# Patient Record
Sex: FEMALE | Race: WHITE | NOT HISPANIC OR LATINO
[De-identification: names, ages, dates, MRNs, and addresses within clinical notes are randomized per-mention and may not be internally consistent; named-entity substitution may affect disease eponyms.]

---

## 2018-08-23 ENCOUNTER — APPOINTMENT (OUTPATIENT)
Dept: CT IMAGING | Facility: IMAGING CENTER | Age: 68
End: 2018-08-23
Payer: MEDICARE

## 2018-08-23 ENCOUNTER — OUTPATIENT (OUTPATIENT)
Dept: OUTPATIENT SERVICES | Facility: HOSPITAL | Age: 68
LOS: 1 days | End: 2018-08-23
Payer: MEDICARE

## 2018-08-23 DIAGNOSIS — Z00.8 ENCOUNTER FOR OTHER GENERAL EXAMINATION: ICD-10-CM

## 2018-08-23 PROCEDURE — 71250 CT THORAX DX C-: CPT

## 2018-08-23 PROCEDURE — 71250 CT THORAX DX C-: CPT | Mod: 26

## 2019-05-13 ENCOUNTER — RESULT REVIEW (OUTPATIENT)
Age: 69
End: 2019-05-13

## 2019-10-16 ENCOUNTER — APPOINTMENT (OUTPATIENT)
Dept: OPHTHALMOLOGY | Facility: CLINIC | Age: 69
End: 2019-10-16

## 2021-07-27 ENCOUNTER — APPOINTMENT (OUTPATIENT)
Dept: ORTHOPEDIC SURGERY | Facility: CLINIC | Age: 71
End: 2021-07-27
Payer: MEDICARE

## 2021-07-27 VITALS — BODY MASS INDEX: 32.25 KG/M2 | WEIGHT: 160 LBS | HEIGHT: 59 IN

## 2021-07-27 DIAGNOSIS — Z87.898 PERSONAL HISTORY OF OTHER SPECIFIED CONDITIONS: ICD-10-CM

## 2021-07-27 PROCEDURE — 72100 X-RAY EXAM L-S SPINE 2/3 VWS: CPT

## 2021-07-27 PROCEDURE — 99204 OFFICE O/P NEW MOD 45 MIN: CPT

## 2021-07-27 PROCEDURE — 72070 X-RAY EXAM THORAC SPINE 2VWS: CPT

## 2021-07-27 RX ORDER — OMEPRAZOLE 20 MG/1
20 CAPSULE, DELAYED RELEASE ORAL DAILY
Qty: 30 | Refills: 1 | Status: ACTIVE | COMMUNITY
Start: 2021-07-27 | End: 1900-01-01

## 2021-07-27 RX ORDER — DICLOFENAC SODIUM 75 MG/1
75 TABLET, DELAYED RELEASE ORAL
Qty: 60 | Refills: 0 | Status: ACTIVE | COMMUNITY
Start: 2021-07-27 | End: 1900-01-01

## 2021-07-27 NOTE — DISCUSSION/SUMMARY
[Medication Risks Reviewed] : Medication risks reviewed [de-identified] : I have recommended rest and moist heat.  She has been started on diclofenac 75 mg twice a day as a nonsteroidal anti-inflammatory and I will see her for follow-up in 3 weeks.  She will call if there are problems with the medication or worsening of her symptoms.

## 2021-07-27 NOTE — HISTORY OF PRESENT ILLNESS
[de-identified] : This 71-year-old woman has a long history of intermittent back symptoms.  They became significantly worse with some left-sided mid back pain in 2019.  She had an MRI at that point but interestingly while she had mid back pain the MRI was of her lower back.  It revealed significant degenerative changes at the L2-3 level.  She saw an orthopedic surgeon and had a Medrol Dosepak and the symptoms resolved.  2 or 3 days ago she had the onset of right-sided mid back pain.  It does not radiate anterior.  The character of the pain is clearly different from when she has kidney stones.  The pain is slightly worse coughing but no worse forcing to move her bowels.  It can be difficult to get to sleep but she has not had night pain.  There have been no changes in her gait or balance.  There has been some discomfort in the right anterior thigh.  She has taken some occasional Aleve.\par \par She has had kidney stones 4 times in the past.  She gets occasional heartburn. [Pain Location] : pain [Worsening] : worsening [7] : a maximum pain level of 7/10

## 2021-07-27 NOTE — PHYSICAL EXAM
[de-identified] : She is fully alert and oriented with a normal mood and affect.  She is in no acute distress as I take the history.  She is overweight.  She ambulates with a normal gait including tiptoe and heel walking.  There are no cutaneous abnormalities of the spine that could suggest this was shingles.  There are no palpable bony defects of the spine.  There is no pain with medial lateral chest compression and there is no rib tenderness.  Forward flexion of the spine to 80 degrees causes mid back discomfort.  Her lower extremity neurological examination revealed 1+ symmetrical reflexes.  Motor power is normal to manual testing in all lower extremity groups and sensation is normal to light touch in all dermatomes.  Straight leg raising is negative to 90 degrees in the sitting position.  Her hips or knees have a full and painless range of motion with normal stability.  Vascular examination shows no evidence of significant varicosities and there is no lymphedema.  There are no cutaneous abnormalities of the upper extremities or of the lower extremities.  Her upper extremities are normal to inspection and her elbows have a full and painless range of motion with normal motor power normal stability. [de-identified] : I reviewed prior x-rays of the spine from 2013.  MRI of the lumbar spine in 2019 revealed marked degenerative changes at the L2-3 level and a grade 1 spondylolisthesis at L4-5.  AP and lateral x-rays of the thoracic spine revealed a small superior endplate indentation of T10 possibly suggestive of a prior mild compression fracture.  There are multilevel degenerative changes.  Plain x-rays of the lumbar spine also revealed marked multilevel degenerative changes.  An old x-ray of the thoracic spine shows that that superior endplate indentation in T10 was not quite as apparent at the time of that x-ray in 2013.

## 2021-07-30 ENCOUNTER — TRANSCRIPTION ENCOUNTER (OUTPATIENT)
Age: 71
End: 2021-07-30

## 2021-08-13 ENCOUNTER — NON-APPOINTMENT (OUTPATIENT)
Age: 71
End: 2021-08-13

## 2021-08-18 ENCOUNTER — APPOINTMENT (OUTPATIENT)
Dept: ORTHOPEDIC SURGERY | Facility: CLINIC | Age: 71
End: 2021-08-18
Payer: MEDICARE

## 2021-08-18 VITALS
DIASTOLIC BLOOD PRESSURE: 81 MMHG | HEIGHT: 59 IN | HEART RATE: 89 BPM | WEIGHT: 160 LBS | SYSTOLIC BLOOD PRESSURE: 132 MMHG | BODY MASS INDEX: 32.25 KG/M2

## 2021-08-18 DIAGNOSIS — M54.9 DORSALGIA, UNSPECIFIED: ICD-10-CM

## 2021-08-18 DIAGNOSIS — M47.814 SPONDYLOSIS W/OUT MYELOPATHY OR RADICULOPATHY, THORACIC REGION: ICD-10-CM

## 2021-08-18 PROCEDURE — 99213 OFFICE O/P EST LOW 20 MIN: CPT

## 2021-08-18 NOTE — PHYSICAL EXAM
[de-identified] : She is comfortable sitting.  There is no increased kyphosis.  Straight leg raising is negative to 90 degrees.

## 2021-08-18 NOTE — HISTORY OF PRESENT ILLNESS
[de-identified] : She did not have problems tolerating the Voltaren.  Within 4 to 5 days she was dramatically better and within a week she was fully better and stop the diclofenac.  Currently she has no symptoms. [Pain Location] : pain [Improving] : improving [0] : a maximum pain level of 0/10

## 2021-08-18 NOTE — DISCUSSION/SUMMARY
[Medication Risks Reviewed] : Medication risks reviewed [de-identified] : We discussed how to manage any future episodes of back pain.  She has more degenerative changes in the upper lumbar spine yet her symptoms have mostly been left or right-sided mid back pain.  I will see her for follow-up on a as needed basis.

## 2022-04-08 ENCOUNTER — RX RENEWAL (OUTPATIENT)
Age: 72
End: 2022-04-08

## 2022-12-19 ENCOUNTER — OUTPATIENT (OUTPATIENT)
Dept: OUTPATIENT SERVICES | Facility: HOSPITAL | Age: 72
LOS: 1 days | End: 2022-12-19
Payer: MEDICARE

## 2022-12-19 ENCOUNTER — APPOINTMENT (OUTPATIENT)
Dept: RADIOLOGY | Facility: IMAGING CENTER | Age: 72
End: 2022-12-19

## 2022-12-19 DIAGNOSIS — Z00.8 ENCOUNTER FOR OTHER GENERAL EXAMINATION: ICD-10-CM

## 2022-12-19 PROCEDURE — 77080 DXA BONE DENSITY AXIAL: CPT | Mod: 26

## 2022-12-19 PROCEDURE — 77080 DXA BONE DENSITY AXIAL: CPT

## 2025-09-16 ENCOUNTER — NON-APPOINTMENT (OUTPATIENT)
Age: 75
End: 2025-09-16